# Patient Record
Sex: FEMALE | Race: WHITE | NOT HISPANIC OR LATINO
[De-identification: names, ages, dates, MRNs, and addresses within clinical notes are randomized per-mention and may not be internally consistent; named-entity substitution may affect disease eponyms.]

---

## 2017-05-24 ENCOUNTER — APPOINTMENT (OUTPATIENT)
Dept: OTOLARYNGOLOGY | Facility: CLINIC | Age: 67
End: 2017-05-24

## 2017-05-24 VITALS — SYSTOLIC BLOOD PRESSURE: 83 MMHG | OXYGEN SATURATION: 95 % | DIASTOLIC BLOOD PRESSURE: 50 MMHG | HEART RATE: 72 BPM

## 2017-05-24 DIAGNOSIS — H90.3 SENSORINEURAL HEARING LOSS, BILATERAL: ICD-10-CM

## 2018-03-14 ENCOUNTER — APPOINTMENT (OUTPATIENT)
Dept: OTOLARYNGOLOGY | Facility: CLINIC | Age: 68
End: 2018-03-14
Payer: MEDICARE

## 2018-03-14 VITALS
SYSTOLIC BLOOD PRESSURE: 115 MMHG | OXYGEN SATURATION: 97 % | HEART RATE: 67 BPM | TEMPERATURE: 97.7 F | DIASTOLIC BLOOD PRESSURE: 72 MMHG

## 2018-03-14 DIAGNOSIS — H92.01 OTALGIA, RIGHT EAR: ICD-10-CM

## 2018-03-14 PROCEDURE — 99214 OFFICE O/P EST MOD 30 MIN: CPT | Mod: 25

## 2018-03-14 PROCEDURE — 92567 TYMPANOMETRY: CPT

## 2018-03-14 PROCEDURE — 31231 NASAL ENDOSCOPY DX: CPT

## 2018-03-14 RX ORDER — METHYLPREDNISOLONE 4 MG/1
4 TABLET ORAL
Qty: 1 | Refills: 0 | Status: ACTIVE | COMMUNITY
Start: 2018-03-14 | End: 1900-01-01

## 2018-03-15 PROBLEM — H92.01 OTALGIA, RIGHT: Status: ACTIVE | Noted: 2018-03-15

## 2019-02-01 ENCOUNTER — APPOINTMENT (OUTPATIENT)
Dept: OTOLARYNGOLOGY | Facility: CLINIC | Age: 69
End: 2019-02-01

## 2019-04-04 ENCOUNTER — APPOINTMENT (OUTPATIENT)
Dept: OTOLARYNGOLOGY | Facility: CLINIC | Age: 69
End: 2019-04-04
Payer: MEDICARE

## 2019-04-04 VITALS
DIASTOLIC BLOOD PRESSURE: 68 MMHG | OXYGEN SATURATION: 98 % | HEART RATE: 69 BPM | RESPIRATION RATE: 17 BRPM | SYSTOLIC BLOOD PRESSURE: 103 MMHG | TEMPERATURE: 97.8 F

## 2019-04-04 DIAGNOSIS — J30.1 ALLERGIC RHINITIS DUE TO POLLEN: ICD-10-CM

## 2019-04-04 DIAGNOSIS — H61.23 IMPACTED CERUMEN, BILATERAL: ICD-10-CM

## 2019-04-04 PROCEDURE — 99213 OFFICE O/P EST LOW 20 MIN: CPT

## 2019-04-04 NOTE — PHYSICAL EXAM
[de-identified] : r ok;l mild cerumen rmeoved with curet [de-identified] : mild ar [Normal] : no masses and lesions seen, face is symmetric

## 2021-07-21 ENCOUNTER — APPOINTMENT (OUTPATIENT)
Dept: OTOLARYNGOLOGY | Facility: CLINIC | Age: 71
End: 2021-07-21

## 2021-07-21 ENCOUNTER — APPOINTMENT (OUTPATIENT)
Dept: OTOLARYNGOLOGY | Facility: CLINIC | Age: 71
End: 2021-07-21
Payer: MEDICARE

## 2021-07-21 VITALS
DIASTOLIC BLOOD PRESSURE: 61 MMHG | SYSTOLIC BLOOD PRESSURE: 107 MMHG | BODY MASS INDEX: 20.09 KG/M2 | TEMPERATURE: 97.9 F | OXYGEN SATURATION: 96 % | WEIGHT: 125 LBS | HEIGHT: 66 IN | HEART RATE: 70 BPM
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
## 2021-07-21 DIAGNOSIS — S02.2XXA FRACTURE OF NASAL BONES, INITIAL ENCOUNTER FOR CLOSED FRACTURE: ICD-10-CM

## 2021-07-21 DIAGNOSIS — J34.2 DEVIATED NASAL SEPTUM: ICD-10-CM

## 2021-07-21 PROCEDURE — 99214 OFFICE O/P EST MOD 30 MIN: CPT

## 2021-09-22 NOTE — CONSULT LETTER
[Dear  ___] : Dear  [unfilled], [Consult Letter:] : I had the pleasure of evaluating your patient, [unfilled]. [Please see my note below.] : Please see my note below. [Consult Closing:] : Thank you very much for allowing me to participate in the care of this patient.  If you have any questions, please do not hesitate to contact me. [Sincerely,] : Sincerely, [FreeTextEntry3] : Silver Evans MD, FACS\par Professor of Otolaryngology, [employer] [facility]\par Director, [facility], Department of Otolaryngology, [facility]\par , Head [facility] Neck Service Line, [facility]\par

## 2021-09-22 NOTE — PHYSICAL EXAM
[Normal] : mucosa is normal [Midline] : trachea located in midline position [de-identified] : Laceration of upper lip sp repair a absorbable sutures minimal swelling  [de-identified] : Non displaced fx Internally no sign of injury

## 2021-09-22 NOTE — REVIEW OF SYSTEMS
[Patient Intake Form Reviewed] : Patient intake form was reviewed [As Noted in HPI] : as noted in HPI [Negative] : Heme/Lymph [de-identified] : Lip laceration

## 2021-09-22 NOTE — REASON FOR VISIT
[Initial Evaluation] : an initial evaluation for [FreeTextEntry2] : Accidental fall, nasal fracture on Saturday 07/17/2021.  Patient states her level of severity is a level 10 out of 10 and it occurs constant.  Patient states nothing helps to improve or worsens her Accidental fall, nasal fracture on Saturday 07/17/2021

## 2021-09-22 NOTE — HISTORY OF PRESENT ILLNESS
[de-identified] : 71 years old female patient with history of Accidental fall, nasal fracture on Saturday 07/17/2021.  Patient is present today in the office with nondisplaced nasal fracture.  Status post reconstruction of Upper lip laceration.  Sutures and adhesive tape are in place.

## 2022-08-02 ENCOUNTER — APPOINTMENT (OUTPATIENT)
Dept: OTOLARYNGOLOGY | Facility: CLINIC | Age: 72
End: 2022-08-02

## 2022-08-02 VITALS
SYSTOLIC BLOOD PRESSURE: 103 MMHG | RESPIRATION RATE: 16 BRPM | TEMPERATURE: 97.8 F | OXYGEN SATURATION: 98 % | HEART RATE: 67 BPM | WEIGHT: 125 LBS | DIASTOLIC BLOOD PRESSURE: 66 MMHG | HEIGHT: 66 IN | BODY MASS INDEX: 20.09 KG/M2

## 2022-08-02 DIAGNOSIS — H69.83 OTHER SPECIFIED DISORDERS OF EUSTACHIAN TUBE, BILATERAL: ICD-10-CM

## 2022-08-02 DIAGNOSIS — J32.2 CHRONIC ETHMOIDAL SINUSITIS: ICD-10-CM

## 2022-08-02 PROCEDURE — 31231 NASAL ENDOSCOPY DX: CPT

## 2022-08-02 PROCEDURE — 99214 OFFICE O/P EST MOD 30 MIN: CPT | Mod: 25

## 2022-08-02 NOTE — ASSESSMENT
[FreeTextEntry1] : sinus infx resolving on second abx\par she can insufflate\par ok to fly\par afrin before flight\par robitussin dm for cough\par autoinsufflate tid\par finish out amoxacillin\par rtc as needed \par

## 2022-08-02 NOTE — HISTORY OF PRESENT ILLNESS
[de-identified] : 73 yo f  developed covid 7/13/22, quarantined 10 d and macie negative now. She developed severe sinus sx - she took z pack no help; she called Dr Edward and was prescribed amox for 10 d; she has taken for 5 d and improved but has cough and ears are full but she can clear them. She needs to visit her 93 yo mother and plans to fly this weekend. No f/s/c

## 2022-08-02 NOTE — PROCEDURE
[Posterior Lesion] : posterior lesion [Anterior rhinoscopy insufficient to account for symptoms] : anterior rhinoscopy insufficient to account for symptoms [Flexible Endoscope] : examined with the flexible endoscope [Serial Number: ___] : Serial Number: [unfilled] [Congested] : congested [Paranasal Sinuses Middle Meatus] : bilateral purulence [Normal] : the paranasal sinuses had no abnormalities [FreeTextEntry6] : done for sinus sx\par mild clear omu drainage c/w resolving sinusitis\par npx ok

## 2025-06-18 ENCOUNTER — APPOINTMENT (OUTPATIENT)
Dept: ORTHOPEDIC SURGERY | Facility: CLINIC | Age: 75
End: 2025-06-18
Payer: MEDICARE

## 2025-06-18 PROBLEM — M75.21 TENDINITIS OF UPPER BICEPS TENDON OF RIGHT SHOULDER: Status: ACTIVE | Noted: 2025-06-18

## 2025-06-18 PROBLEM — M75.41 IMPINGEMENT SYNDROME OF SHOULDER REGION, RIGHT: Status: ACTIVE | Noted: 2025-06-18

## 2025-06-18 PROBLEM — S46.211A STRAIN OF RIGHT BICEPS TENDON: Status: ACTIVE | Noted: 2025-06-18

## 2025-06-18 PROCEDURE — 99204 OFFICE O/P NEW MOD 45 MIN: CPT

## 2025-06-18 PROCEDURE — 73030 X-RAY EXAM OF SHOULDER: CPT | Mod: RT

## 2025-07-31 ENCOUNTER — APPOINTMENT (OUTPATIENT)
Dept: ORTHOPEDIC SURGERY | Facility: CLINIC | Age: 75
End: 2025-07-31